# Patient Record
Sex: FEMALE | Race: WHITE | Employment: UNEMPLOYED | URBAN - METROPOLITAN AREA
[De-identification: names, ages, dates, MRNs, and addresses within clinical notes are randomized per-mention and may not be internally consistent; named-entity substitution may affect disease eponyms.]

---

## 2018-07-25 ENCOUNTER — OFFICE VISIT (OUTPATIENT)
Dept: INTERNAL MEDICINE | Facility: CLINIC | Age: 23
End: 2018-07-25
Payer: COMMERCIAL

## 2018-07-25 VITALS
TEMPERATURE: 98 F | DIASTOLIC BLOOD PRESSURE: 76 MMHG | WEIGHT: 145.5 LBS | SYSTOLIC BLOOD PRESSURE: 131 MMHG | BODY MASS INDEX: 24.98 KG/M2

## 2018-07-25 DIAGNOSIS — H69.91 DYSFUNCTION OF RIGHT EUSTACHIAN TUBE: Primary | ICD-10-CM

## 2018-07-25 PROCEDURE — 3008F BODY MASS INDEX DOCD: CPT | Mod: CPTII,S$GLB,, | Performed by: STUDENT IN AN ORGANIZED HEALTH CARE EDUCATION/TRAINING PROGRAM

## 2018-07-25 PROCEDURE — 99999 PR PBB SHADOW E&M-NEW PATIENT-LVL III: CPT | Mod: PBBFAC,,, | Performed by: STUDENT IN AN ORGANIZED HEALTH CARE EDUCATION/TRAINING PROGRAM

## 2018-07-25 PROCEDURE — 99203 OFFICE O/P NEW LOW 30 MIN: CPT | Mod: S$GLB,,, | Performed by: STUDENT IN AN ORGANIZED HEALTH CARE EDUCATION/TRAINING PROGRAM

## 2018-07-25 NOTE — PROGRESS NOTES
Subjective     Chief Complaint: Ear pain    History of Present Illness: Right ear pain    Ms. Veronique Joiner is a 22 y.o. female with no significant medical history who is here with three day history of right ear pain that initially started in the jaw. Pain is throbbing with 6/10 in severity. Patient describes a sensation of ear popping at times, has a history of seasonal allergies, reports increase in severity of allergy in the past two weeks prior ear pain. Works at a [employer] and has been using head set on the left ear only due to muffled hearing on the right. She otherwise has no other symptoms : no fever, chills, cough, sore throat or other URI symptoms.  Has no history of ear infection.     Review of Systems   Constitutional: Negative for chills and fever.   HENT: Positive for ear pain. Negative for congestion, ear discharge, sinus pain, sore throat and tinnitus.    Eyes: Negative for blurred vision and double vision.   Respiratory: Negative for cough, shortness of breath and wheezing.    Cardiovascular: Negative for chest pain, palpitations and leg swelling.   Gastrointestinal: Negative for abdominal pain, nausea and vomiting.   Genitourinary: Negative for frequency, hematuria and urgency.   Musculoskeletal: Negative for back pain, myalgias and neck pain.   Skin: Negative for itching and rash.   Neurological: Negative for dizziness, focal weakness, seizures and headaches.       PAST HISTORY:     No past medical history on file.    Past Surgical History:   Procedure Laterality Date    WISDOM TOOTH EXTRACTION         Family History   Problem Relation Age of Onset    Stroke Father     Stroke Maternal Aunt     Cancer Neg Hx     Diabetes Neg Hx     Heart disease Neg Hx     Hyperlipidemia Neg Hx     Hypertension Neg Hx        Social History     Social History    Marital status: Single     Spouse name: N/A    Number of children: N/A    Years of education: N/A     Occupational History    Works at  [employer]      Social History Main Topics    Smoking status: Never Smoker    Smokeless tobacco: Not on file    Alcohol use No    Drug use: No    Sexual activity: Not on file     Other Topics Concern    Not on file     Social History Narrative    Lives w/family       MEDICATIONS & ALLERGIES:     No current outpatient prescriptions on file prior to visit.     No current facility-administered medications on file prior to visit.        Review of patient's allergies indicates:  No Known Allergies    OBJECTIVE:     Vital Signs:  Vitals:    07/25/18 1517   Weight: 66 kg (145 lb 8.1 oz)     Vitals:    07/25/18 1517   BP: 131/76   Temp: 98.2 °F (36.8 °C)     Body mass index is 24.98 kg/m².     Physical Exam:  General:  Well developed, well nourished, no acute distress  Head: Normocephalic, atraumatic  Eyes: PERRL, EOMI, clear sclera  Throat: No posterior pharyngeal erythema or exudate, no tonsillar exudate  Neck: supple, normal ROM, no thyromegaly   CVS:  RRR, S1 and S2 normal, no murmurs, rubs, gallops, 2+ peripheral pulses  Resp:  Lungs clear to auscultation, no wheezes, rales, rhonchi, cough  GI:  Abdomen soft, non-tender, non-distended, normoactive bowel sounds  MSK:  No muscle atrophy, cyanosis, peripheral edema   Skin:  No rashes, ulcers, erythema  Neuro:  CNII-XII grossly intact, no focal deficits noted  Psych:  Appropriate mood and affect, normal judgement    Laboratory  Lab Results   Component Value Date    HGB 13.3 11/01/2011     @[id number](GLU,NA,K,Cl,CO2,BUN,Creatinine,Calcium,MG)@  No results found for: INR, PROTIME  No results found for: HGBA1C  No results for input(s): POCTGLUCOSE in the last 72 hours.    ASSESSMENT & PLAN:   Ms. Veronique Joiner is a 22 y.o. female    Diagnoses and all orders for this visit:    Dysfunction of right eustachian tube  -Patient with ear pain and history of seasonal allergies  -Allergies worse prior ear pain, pain most likely due to pressure build up in right middle ear  -No  signs of infection on physical exam to suggest AOM. No fever, no URI symptoms.  -Continue to use antihistamines and Flonase to better control allergic symptoms and ear pain.     RTC in PRN    Discussed with Dr. Keyes - staff attestation to follow

## 2018-07-25 NOTE — PATIENT INSTRUCTIONS
-You may have Eustachian tube dysfunction causing pressure build up in your middle ear  -Try Zyrtec or allegra to decrease symptoms and continue to use Flonase to control your allergy.